# Patient Record
Sex: FEMALE | Race: WHITE | NOT HISPANIC OR LATINO | Employment: FULL TIME | URBAN - NONMETROPOLITAN AREA
[De-identification: names, ages, dates, MRNs, and addresses within clinical notes are randomized per-mention and may not be internally consistent; named-entity substitution may affect disease eponyms.]

---

## 2023-12-08 ENCOUNTER — TELEPHONE (OUTPATIENT)
Dept: GASTROENTEROLOGY | Facility: CLINIC | Age: 63
End: 2023-12-08

## 2023-12-08 ENCOUNTER — OFFICE VISIT (OUTPATIENT)
Dept: GASTROENTEROLOGY | Facility: CLINIC | Age: 63
End: 2023-12-08
Payer: COMMERCIAL

## 2023-12-08 VITALS — BODY MASS INDEX: 18.44 KG/M2 | WEIGHT: 108 LBS | HEIGHT: 64 IN

## 2023-12-08 DIAGNOSIS — R10.13 DYSPEPSIA: Primary | ICD-10-CM

## 2023-12-08 PROBLEM — B37.9 YEAST INFECTION: Status: ACTIVE | Noted: 2023-12-08

## 2023-12-08 PROCEDURE — 99214 OFFICE O/P EST MOD 30 MIN: CPT | Performed by: INTERNAL MEDICINE

## 2023-12-08 RX ORDER — FAMOTIDINE 40 MG/1
40 TABLET, FILM COATED ORAL DAILY
Qty: 84 TABLET | Refills: 4 | Status: SHIPPED
Start: 2023-12-08 | End: 2024-01-26 | Stop reason: WASHOUT

## 2023-12-08 RX ORDER — SUCRALFATE 1 G/1
1 TABLET ORAL
COMMUNITY
End: 2023-12-08

## 2023-12-08 RX ORDER — ONDANSETRON 4 MG/1
4 TABLET, ORALLY DISINTEGRATING ORAL EVERY 4 HOURS PRN
COMMUNITY
Start: 2023-12-01 | End: 2023-12-21 | Stop reason: ALTCHOICE

## 2023-12-08 RX ORDER — FLUCONAZOLE 150 MG/1
150 TABLET ORAL DAILY
Qty: 2 TABLET | Refills: 0 | Status: SHIPPED | OUTPATIENT
Start: 2023-12-08 | End: 2023-12-21 | Stop reason: WASHOUT

## 2023-12-08 ASSESSMENT — ENCOUNTER SYMPTOMS
HEMATOLOGIC/LYMPHATIC NEGATIVE: 1
CARDIOVASCULAR NEGATIVE: 1
EYES NEGATIVE: 1
NEUROLOGICAL NEGATIVE: 1
NAUSEA: 1
RESPIRATORY NEGATIVE: 1
ENDOCRINE NEGATIVE: 1
CONSTITUTIONAL NEGATIVE: 1
ABDOMINAL PAIN: 1

## 2023-12-08 NOTE — PROGRESS NOTES
Subjective   Patient ID: Ekaterina Truong is a 63 y.o. female who presents for Follow-up (Patient was seen at [facility] for kidney stone symptoms and had CT chest, abdomen and pelvis and had some abdominal findings on it. Patient was having lower back pain with leg cramping. Patient is on Zenpep for EPI. Patient is having some whiteish stools but is eating a lot of yogurt. Patient is concerned may have thrush and yeast infection. ) and Nausea (Pt reports no nausea until starting sucralfate. ).  HPI     Ekaterina was hospitalized for repeat hospital after finding right kidney stone.  She was able to pass the stone with out surgical intervention.  She did have findings of duodenitis on CT scan she was placed on Carafate at discharge along with antibiotics for 5 days for UTI.  Since returning home she has had persistent pain in her epigastrium she states she feels worse with the Carafate is making her nauseous and she does not feel like eating.  It did cause some minor constipation as well.  She denies any melanic stools or vomiting.    I was unable to review her CT as it was not available from [facility].  She states she has a follow-up CT scheduled for January 8.    She feels as though she developed a vaginal yeast infection with the antibiotics and requested prescription for Diflucan as well.    Review of Systems   Constitutional: Negative.    HENT: Negative.     Eyes: Negative.    Respiratory: Negative.     Cardiovascular: Negative.    Gastrointestinal:  Positive for abdominal pain and nausea.   Endocrine: Negative.    Genitourinary: Negative.    Neurological: Negative.    Hematological: Negative.        Objective   Physical Exam  Vitals and nursing note reviewed.   Constitutional:       Appearance: Normal appearance.   HENT:      Head: Normocephalic.      Mouth/Throat:      Mouth: Mucous membranes are moist.      Pharynx: Oropharynx is clear.   Eyes:      Conjunctiva/sclera: Conjunctivae normal.      Pupils:  Pupils are equal, round, and reactive to light.   Cardiovascular:      Pulses: Normal pulses.      Heart sounds: Normal heart sounds.   Pulmonary:      Effort: Pulmonary effort is normal.      Breath sounds: Normal breath sounds.   Abdominal:      General: Abdomen is flat. Bowel sounds are normal.      Palpations: Abdomen is soft.   Musculoskeletal:      Cervical back: Normal range of motion and neck supple.   Skin:     General: Skin is warm and dry.   Neurological:      General: No focal deficit present.      Mental Status: She is alert and oriented to person, place, and time.   Psychiatric:         Behavior: Behavior normal.         Assessment/Plan   Problem List Items Addressed This Visit             ICD-10-CM    Dyspepsia - Primary R10.13    Relevant Medications    famotidine (Pepcid) 40 mg tablet    fluconazole (Diflucan) 150 mg tablet     I have recommended she discontinue the Carafate will begin Pepcid 40 mg daily.  Will prescribe Diflucan 150 mg to be taken today the second pill is prescribed be taken 3 days later for the yeast infection is not resolved.  I advised her to call in 2 weeks of her symptoms of dyspepsia if not improved would like to arrange endoscopy.  I advised her that it might be best to get the CT scan before the end of the year for insurance reasons and she will contact urologist regarding same.       Brian Fairbanks DO 12/08/23 1:50 PM

## 2023-12-21 ENCOUNTER — OFFICE VISIT (OUTPATIENT)
Dept: PRIMARY CARE | Facility: CLINIC | Age: 63
End: 2023-12-21
Payer: COMMERCIAL

## 2023-12-21 ENCOUNTER — TELEPHONE (OUTPATIENT)
Dept: GASTROENTEROLOGY | Facility: CLINIC | Age: 63
End: 2023-12-21
Payer: COMMERCIAL

## 2023-12-21 VITALS
BODY MASS INDEX: 17.42 KG/M2 | TEMPERATURE: 98.6 F | DIASTOLIC BLOOD PRESSURE: 98 MMHG | HEIGHT: 64 IN | SYSTOLIC BLOOD PRESSURE: 156 MMHG | HEART RATE: 101 BPM | WEIGHT: 102 LBS

## 2023-12-21 DIAGNOSIS — J06.9 UPPER RESPIRATORY TRACT INFECTION, UNSPECIFIED TYPE: Primary | ICD-10-CM

## 2023-12-21 DIAGNOSIS — R03.0 ELEVATED BP WITHOUT DIAGNOSIS OF HYPERTENSION: ICD-10-CM

## 2023-12-21 LAB — POC RAPID STREP: NEGATIVE

## 2023-12-21 PROCEDURE — 99213 OFFICE O/P EST LOW 20 MIN: CPT

## 2023-12-21 PROCEDURE — 87880 STREP A ASSAY W/OPTIC: CPT

## 2023-12-21 RX ORDER — AZITHROMYCIN 250 MG/1
TABLET, FILM COATED ORAL
Qty: 6 TABLET | Refills: 0 | Status: SHIPPED | OUTPATIENT
Start: 2023-12-21 | End: 2023-12-25

## 2023-12-21 ASSESSMENT — PATIENT HEALTH QUESTIONNAIRE - PHQ9
2. FEELING DOWN, DEPRESSED OR HOPELESS: NOT AT ALL
SUM OF ALL RESPONSES TO PHQ9 QUESTIONS 1 AND 2: 0
1. LITTLE INTEREST OR PLEASURE IN DOING THINGS: NOT AT ALL

## 2023-12-21 ASSESSMENT — ENCOUNTER SYMPTOMS
FATIGUE: 1
CARDIOVASCULAR NEGATIVE: 1
RESPIRATORY NEGATIVE: 1
GASTROINTESTINAL NEGATIVE: 1

## 2023-12-21 NOTE — TELEPHONE ENCOUNTER
Patient called stating that she has been taking Pepcid and is not having an appetite and some nausea, she asked if she could switch back to her Pantoprazole instead, so I instructed her to take the pantoprazole this evening instead of the Pepcid and to start back on the pepcid if it was more effective for her. I have sent Dr. Fairbanks a message with this information and awaiting a response. Patient also states that she feels like she has a sinus infection and requested an RX for an antibiotic. I instructed her to call her PCP due to Dr. Fairbanks being out of the office. Patient verbalized understanding.

## 2023-12-22 ENCOUNTER — TELEPHONE (OUTPATIENT)
Dept: GASTROENTEROLOGY | Facility: CLINIC | Age: 63
End: 2023-12-22
Payer: COMMERCIAL

## 2023-12-22 NOTE — TELEPHONE ENCOUNTER
PATIENT NOTIFIED AND VERBALIZED UNDERSTANDING     ----- Message from Brian Fairbanks DO sent at 12/21/2023  2:41 PM EST -----  Have her try taking Pepcid twice daily.  ----- Message -----  From: Claudia Ramirez MA  Sent: 12/21/2023  10:08 AM EST  To: Brian Fairbanks DO    Pt calling stating that she believes the medication is helping her at night but not during the day for PPI but wearing off during the day. She is taking it in the evening

## 2023-12-28 ENCOUNTER — TELEPHONE (OUTPATIENT)
Dept: PRIMARY CARE | Facility: CLINIC | Age: 63
End: 2023-12-28
Payer: COMMERCIAL

## 2023-12-28 NOTE — TELEPHONE ENCOUNTER
Patient called and was reporting her BP   12/22//84  12/23//84  12/24//86  12/25//85  12/26//83  12/27//88  12/28//84    She said started a stress J vitamin which seems to help, she is doing what she can to get her BP down without going on medication.   If you have questions please call her   Thank you

## 2024-01-26 ENCOUNTER — OFFICE VISIT (OUTPATIENT)
Dept: PRIMARY CARE | Facility: CLINIC | Age: 64
End: 2024-01-26
Payer: COMMERCIAL

## 2024-01-26 VITALS
OXYGEN SATURATION: 98 % | BODY MASS INDEX: 17.42 KG/M2 | TEMPERATURE: 97.3 F | HEIGHT: 64 IN | HEART RATE: 97 BPM | SYSTOLIC BLOOD PRESSURE: 146 MMHG | WEIGHT: 102 LBS | DIASTOLIC BLOOD PRESSURE: 90 MMHG

## 2024-01-26 DIAGNOSIS — R03.0 ELEVATED BP WITHOUT DIAGNOSIS OF HYPERTENSION: ICD-10-CM

## 2024-01-26 DIAGNOSIS — Z13.220 SCREENING FOR CHOLESTEROL LEVEL: ICD-10-CM

## 2024-01-26 DIAGNOSIS — R39.9 UTI SYMPTOMS: ICD-10-CM

## 2024-01-26 DIAGNOSIS — Z13.29 SCREENING FOR THYROID DISORDER: ICD-10-CM

## 2024-01-26 DIAGNOSIS — Z12.31 SCREENING MAMMOGRAM FOR BREAST CANCER: ICD-10-CM

## 2024-01-26 DIAGNOSIS — J01.40 ACUTE NON-RECURRENT PANSINUSITIS: Primary | ICD-10-CM

## 2024-01-26 DIAGNOSIS — R10.13 DYSPEPSIA: ICD-10-CM

## 2024-01-26 LAB
POC APPEARANCE, URINE: CLEAR
POC BILIRUBIN, URINE: NEGATIVE
POC BLOOD, URINE: ABNORMAL
POC COLOR, URINE: YELLOW
POC GLUCOSE, URINE: NEGATIVE MG/DL
POC KETONES, URINE: NEGATIVE MG/DL
POC LEUKOCYTES, URINE: NEGATIVE
POC NITRITE,URINE: NEGATIVE
POC PH, URINE: 7 PH
POC PROTEIN, URINE: NEGATIVE MG/DL
POC SPECIFIC GRAVITY, URINE: 1.01
POC UROBILINOGEN, URINE: 0.2 EU/DL

## 2024-01-26 PROCEDURE — 99214 OFFICE O/P EST MOD 30 MIN: CPT

## 2024-01-26 PROCEDURE — 81003 URINALYSIS AUTO W/O SCOPE: CPT

## 2024-01-26 RX ORDER — PANTOPRAZOLE SODIUM 40 MG/1
40 TABLET, DELAYED RELEASE ORAL
COMMUNITY
Start: 2024-01-03

## 2024-01-26 RX ORDER — AMOXICILLIN AND CLAVULANATE POTASSIUM 875; 125 MG/1; MG/1
875 TABLET, FILM COATED ORAL 2 TIMES DAILY
Qty: 10 TABLET | Refills: 0 | Status: SHIPPED | OUTPATIENT
Start: 2024-01-26 | End: 2024-02-01 | Stop reason: ALTCHOICE

## 2024-01-26 ASSESSMENT — ENCOUNTER SYMPTOMS
CONSTITUTIONAL NEGATIVE: 1
CARDIOVASCULAR NEGATIVE: 1
GASTROINTESTINAL NEGATIVE: 1
RESPIRATORY NEGATIVE: 1

## 2024-01-26 ASSESSMENT — PATIENT HEALTH QUESTIONNAIRE - PHQ9
1. LITTLE INTEREST OR PLEASURE IN DOING THINGS: NOT AT ALL
2. FEELING DOWN, DEPRESSED OR HOPELESS: NOT AT ALL
SUM OF ALL RESPONSES TO PHQ9 QUESTIONS 1 AND 2: 0

## 2024-01-26 NOTE — PROGRESS NOTES
"Subjective   Patient ID: Ekaterina Truong is a 63 y.o. female who presents for pt here for 1 month follow up BP  (Pt c/o cough, sinus drainage , HA x 4 days ).    HPI   ELEVATED BP: BP a little elevated in office today, she does have whitecoat HTN, BP at home consistently <130/80, denies symptoms currently.  DYSPEPSIA: Follows with Dr. Fairbanks.    Endorses sinus pressure/pain, congestion, post nasal drip, cough x4 days.    Endorses has started taking more vitamin supplements recently and urine is somewhat cloudy, no dysuria or other LUTS symptoms.    Needs mammogram.  Colonoscopy per Dr. Fairbanks, up to date.    Review of Systems   Constitutional: Negative.    Respiratory: Negative.     Cardiovascular: Negative.    Gastrointestinal: Negative.        Objective   /90   Pulse 97   Temp 36.3 °C (97.3 °F)   Ht 1.62 m (5' 3.78\")   Wt 46.3 kg (102 lb)   SpO2 98%   BMI 17.63 kg/m²     Physical Exam  Constitutional:       General: She is not in acute distress.     Appearance: Normal appearance. She is not ill-appearing or toxic-appearing.   HENT:      Head: Normocephalic and atraumatic.   Eyes:      Extraocular Movements: Extraocular movements intact.      Conjunctiva/sclera: Conjunctivae normal.   Cardiovascular:      Rate and Rhythm: Normal rate and regular rhythm.      Heart sounds: Normal heart sounds. No murmur heard.     No gallop.   Pulmonary:      Effort: Pulmonary effort is normal.      Breath sounds: Normal breath sounds. No stridor. No wheezing.   Abdominal:      General: Bowel sounds are normal. There is no distension.      Palpations: Abdomen is soft.      Tenderness: There is no abdominal tenderness. There is no right CVA tenderness, left CVA tenderness, guarding or rebound.   Musculoskeletal:         General: Normal range of motion.      Cervical back: Normal range of motion and neck supple.   Skin:     General: Skin is warm and dry.      Findings: No erythema or rash.   Neurological:      General: No " focal deficit present.      Mental Status: She is alert and oriented to person, place, and time.   Psychiatric:         Mood and Affect: Mood normal.         Behavior: Behavior normal.         Thought Content: Thought content normal.         Judgment: Judgment normal.         Assessment/Plan        Rx augmentin BID x5 days.  Fasting labs soon.  Mammogram ordered.  Advised she schedule with GYN.  IO UA unremarkable.  Follow up 3 months or sooner prn.

## 2024-01-29 ENCOUNTER — TELEPHONE (OUTPATIENT)
Dept: PRIMARY CARE | Facility: CLINIC | Age: 64
End: 2024-01-29
Payer: COMMERCIAL

## 2024-01-29 NOTE — TELEPHONE ENCOUNTER
----- Message from Salvador Sadler PA-C sent at 1/29/2024  8:45 AM EST -----  Please let her know her labs look great other than elevated cholesterol, her 10 year cardiac risk is intermediate, we can discuss cholesterol medication at follow up. Thanks!

## 2024-01-31 ENCOUNTER — TELEPHONE (OUTPATIENT)
Dept: PRIMARY CARE | Facility: CLINIC | Age: 64
End: 2024-01-31
Payer: COMMERCIAL

## 2024-02-01 DIAGNOSIS — J01.40 ACUTE NON-RECURRENT PANSINUSITIS: Primary | ICD-10-CM

## 2024-02-01 DIAGNOSIS — B37.9 YEAST INFECTION: Primary | ICD-10-CM

## 2024-02-01 RX ORDER — AZITHROMYCIN 250 MG/1
TABLET, FILM COATED ORAL
Qty: 6 TABLET | Refills: 0 | Status: SHIPPED | OUTPATIENT
Start: 2024-02-01 | End: 2024-02-07 | Stop reason: ALTCHOICE

## 2024-02-01 RX ORDER — FLUCONAZOLE 150 MG/1
150 TABLET ORAL ONCE
Qty: 1 TABLET | Refills: 0 | Status: SHIPPED | OUTPATIENT
Start: 2024-02-01 | End: 2024-02-07 | Stop reason: WASHOUT

## 2024-02-07 DIAGNOSIS — R10.13 DYSPEPSIA: ICD-10-CM

## 2024-02-07 DIAGNOSIS — R10.13 EPIGASTRIC PAIN: ICD-10-CM

## 2024-02-07 RX ORDER — SUCRALFATE 1 G/1
1 TABLET ORAL
COMMUNITY
Start: 2024-02-05 | End: 2024-02-15 | Stop reason: SDUPTHER

## 2024-02-09 ENCOUNTER — HOSPITAL ENCOUNTER (OUTPATIENT)
Dept: GASTROENTEROLOGY | Facility: CLINIC | Age: 64
Setting detail: OUTPATIENT SURGERY
Discharge: HOME | End: 2024-02-09
Payer: COMMERCIAL

## 2024-02-09 VITALS
WEIGHT: 99.4 LBS | TEMPERATURE: 99.2 F | DIASTOLIC BLOOD PRESSURE: 77 MMHG | HEART RATE: 87 BPM | RESPIRATION RATE: 18 BRPM | SYSTOLIC BLOOD PRESSURE: 148 MMHG | BODY MASS INDEX: 16.56 KG/M2 | OXYGEN SATURATION: 96 % | HEIGHT: 65 IN

## 2024-02-09 DIAGNOSIS — R10.13 DYSPEPSIA: Primary | ICD-10-CM

## 2024-02-09 DIAGNOSIS — R10.13 EPIGASTRIC PAIN: ICD-10-CM

## 2024-02-09 PROCEDURE — 88305 TISSUE EXAM BY PATHOLOGIST: CPT | Performed by: PATHOLOGY

## 2024-02-09 PROCEDURE — 2500000005 HC RX 250 GENERAL PHARMACY W/O HCPCS: Performed by: INTERNAL MEDICINE

## 2024-02-09 PROCEDURE — 2500000004 HC RX 250 GENERAL PHARMACY W/ HCPCS (ALT 636 FOR OP/ED): Performed by: INTERNAL MEDICINE

## 2024-02-09 PROCEDURE — 43239 EGD BIOPSY SINGLE/MULTIPLE: CPT | Performed by: INTERNAL MEDICINE

## 2024-02-09 PROCEDURE — 0753T DGTZ GLS MCRSCP SLD LEVEL IV: CPT | Mod: TC,SUR,SAMLAB | Performed by: INTERNAL MEDICINE

## 2024-02-09 PROCEDURE — 7100000009 HC PHASE TWO TIME - INITIAL BASE CHARGE

## 2024-02-09 PROCEDURE — 99152 MOD SED SAME PHYS/QHP 5/>YRS: CPT | Mod: 59 | Performed by: INTERNAL MEDICINE

## 2024-02-09 PROCEDURE — 7100000010 HC PHASE TWO TIME - EACH INCREMENTAL 1 MINUTE

## 2024-02-09 RX ORDER — MIDAZOLAM HYDROCHLORIDE 5 MG/ML
INJECTION, SOLUTION INTRAMUSCULAR; INTRAVENOUS AS NEEDED
Status: COMPLETED | OUTPATIENT
Start: 2024-02-09 | End: 2024-02-09

## 2024-02-09 RX ORDER — FENTANYL CITRATE 50 UG/ML
INJECTION, SOLUTION INTRAMUSCULAR; INTRAVENOUS AS NEEDED
Status: COMPLETED | OUTPATIENT
Start: 2024-02-09 | End: 2024-02-09

## 2024-02-09 RX ORDER — SODIUM CHLORIDE, SODIUM LACTATE, POTASSIUM CHLORIDE, CALCIUM CHLORIDE 600; 310; 30; 20 MG/100ML; MG/100ML; MG/100ML; MG/100ML
20 INJECTION, SOLUTION INTRAVENOUS CONTINUOUS
Status: DISCONTINUED | OUTPATIENT
Start: 2024-02-09 | End: 2024-02-10 | Stop reason: HOSPADM

## 2024-02-09 RX ADMIN — MIDAZOLAM HYDROCHLORIDE 2.5 MG: 5 INJECTION, SOLUTION INTRAMUSCULAR; INTRAVENOUS at 15:30

## 2024-02-09 RX ADMIN — FENTANYL CITRATE 25 MCG: 50 INJECTION, SOLUTION INTRAMUSCULAR; INTRAVENOUS at 15:33

## 2024-02-09 RX ADMIN — BENZOCAINE, BUTAMBEN, AND TETRACAINE HYDROCHLORIDE 2 SPRAY: .028; .004; .004 AEROSOL, SPRAY TOPICAL at 15:27

## 2024-02-09 RX ADMIN — FENTANYL CITRATE 50 MCG: 50 INJECTION, SOLUTION INTRAMUSCULAR; INTRAVENOUS at 15:30

## 2024-02-09 RX ADMIN — SODIUM CHLORIDE, POTASSIUM CHLORIDE, SODIUM LACTATE AND CALCIUM CHLORIDE 20 ML/HR: 600; 310; 30; 20 INJECTION, SOLUTION INTRAVENOUS at 13:38

## 2024-02-09 ASSESSMENT — ENCOUNTER SYMPTOMS
NAUSEA: 1
HEMATOLOGIC/LYMPHATIC NEGATIVE: 1
ABDOMINAL PAIN: 1
CONSTITUTIONAL NEGATIVE: 1
EYES NEGATIVE: 1
RESPIRATORY NEGATIVE: 1
CARDIOVASCULAR NEGATIVE: 1
ENDOCRINE NEGATIVE: 1
NEUROLOGICAL NEGATIVE: 1

## 2024-02-09 ASSESSMENT — PAIN - FUNCTIONAL ASSESSMENT
PAIN_FUNCTIONAL_ASSESSMENT: 0-10

## 2024-02-09 ASSESSMENT — PAIN SCALES - GENERAL
PAINLEVEL_OUTOF10: 0 - NO PAIN

## 2024-02-09 NOTE — H&P
History Of Present Illness  Ekaterina Truong is a 63 y.o. female presenting with chronic dyspepsia abdominal pain presents for EGD.     Past Medical History  Past Medical History:   Diagnosis Date    Abdominal distension (gaseous)     Bloating    Anesthesia of skin     Numbness and tingling    Attention and concentration deficit     Difficulty concentrating    Calculus of kidney     Bilateral kidney stones    Inadequate sleep hygiene     History of difficulty sleeping    Lobulated, fused and horseshoe kidney     Horseshoe kidney    Other conditions influencing health status     Cyst of neck    Other general symptoms and signs     Heat intolerance    Personal history of other diseases of the digestive system     History of constipation    Personal history of other diseases of the musculoskeletal system and connective tissue     History of chronic back pain    Personal history of other diseases of the musculoskeletal system and connective tissue     History of neck pain    Personal history of other diseases of the respiratory system     History of acute sinusitis    Personal history of other diseases of the respiratory system     History of asthma    Personal history of other specified conditions     History of abdominal pain    Personal history of other specified conditions     History of dizziness    Personal history of other specified conditions     History of fever    Personal history of other specified conditions     History of urinary frequency     Surgical History  Past Surgical History:   Procedure Laterality Date    OTHER SURGICAL HISTORY  10/11/2022    Gallbladder surgery    OTHER SURGICAL HISTORY  10/11/2022     section    OTHER SURGICAL HISTORY  10/24/2022    Colonoscopy     Social History  She reports that she has been smoking cigarettes. She has never used smokeless tobacco. She reports that she does not drink alcohol and does not use drugs.    Family History  Family History   Problem Relation  "Name Age of Onset    Hypertension Father      Ovarian cancer Sister          Allergies  Allergies   Allergen Reactions    Tamsulosin Shortness of breath    Cephalosporins Itching    Doxycycline Other    Naproxen Sodium Itching    Omeprazole Itching    Sulfamethoxazole-Trimethoprim Itching     Review of Systems   Constitutional: Negative.    HENT: Negative.     Eyes: Negative.    Respiratory: Negative.     Cardiovascular: Negative.    Gastrointestinal:  Positive for abdominal pain and nausea.   Endocrine: Negative.    Genitourinary: Negative.    Neurological: Negative.    Hematological: Negative.      Pre-sedation Evaluation:  ASA Classification - ASA 2 - Patient with mild systemic disease with no functional limitations  Mallampati Score - II (hard and soft palate, upper portion of tonsils anduvula visible)    Physical Exam  Vitals and nursing note reviewed.   Constitutional:       Appearance: Normal appearance.   HENT:      Head: Normocephalic.      Mouth/Throat:      Mouth: Mucous membranes are moist.      Pharynx: Oropharynx is clear.   Eyes:      Conjunctiva/sclera: Conjunctivae normal.      Pupils: Pupils are equal, round, and reactive to light.   Cardiovascular:      Pulses: Normal pulses.      Heart sounds: Normal heart sounds.   Pulmonary:      Effort: Pulmonary effort is normal.      Breath sounds: Normal breath sounds.   Abdominal:      General: Abdomen is flat. Bowel sounds are normal.      Palpations: Abdomen is soft.   Musculoskeletal:      Cervical back: Normal range of motion and neck supple.   Skin:     General: Skin is warm and dry.   Neurological:      General: No focal deficit present.      Mental Status: She is alert and oriented to person, place, and time.   Psychiatric:         Behavior: Behavior normal.          Last Recorded Vitals  Blood pressure 152/77, pulse 92, temperature 37.3 °C (99.2 °F), temperature source Temporal, resp. rate 17, height 1.638 m (5' 4.5\"), weight 45.1 kg (99 lb 6.4 " oz), SpO2 95 %.     Assessment/Plan   Problem List Items Addressed This Visit       Dyspepsia - Primary    Relevant Orders    EGD     Other Visit Diagnoses       Epigastric pain        Relevant Orders    EGD               PTA/Current Medications:  (Not in a hospital admission)    Current Outpatient Medications   Medication Sig Dispense Refill    lipase-protease-amylase (Zenpep) 40,000-126,000- 168,000 unit capsule Take by mouth 3 times a day.      pantoprazole (ProtoNix) 40 mg EC tablet Take 1 tablet (40 mg) by mouth once daily in the morning. Take before meals.      sucralfate (Carafate) 1 gram tablet Take 1 tablet (1 g) by mouth.      vitamin-B complex split tablet Take by mouth.       No current facility-administered medications for this encounter.     Brian Fairbanks, DO

## 2024-02-09 NOTE — DISCHARGE INSTRUCTIONS

## 2024-02-14 LAB
LABORATORY COMMENT REPORT: NORMAL
PATH REPORT.FINAL DX SPEC: NORMAL
PATH REPORT.GROSS SPEC: NORMAL
PATH REPORT.TOTAL CANCER: NORMAL

## 2024-02-15 ENCOUNTER — OFFICE VISIT (OUTPATIENT)
Dept: GASTROENTEROLOGY | Facility: CLINIC | Age: 64
End: 2024-02-15
Payer: COMMERCIAL

## 2024-02-15 VITALS — HEIGHT: 65 IN | BODY MASS INDEX: 16.83 KG/M2 | WEIGHT: 101 LBS

## 2024-02-15 DIAGNOSIS — B37.9 YEAST INFECTION: Primary | ICD-10-CM

## 2024-02-15 DIAGNOSIS — K29.60 ALKALINE REFLUX GASTRITIS: Primary | ICD-10-CM

## 2024-02-15 PROCEDURE — 99213 OFFICE O/P EST LOW 20 MIN: CPT | Performed by: INTERNAL MEDICINE

## 2024-02-15 RX ORDER — SUCRALFATE 1 G/1
1 TABLET ORAL
Qty: 60 TABLET | Refills: 5 | Status: SHIPPED | OUTPATIENT
Start: 2024-02-15 | End: 2025-02-14

## 2024-02-15 RX ORDER — FLUCONAZOLE 150 MG/1
150 TABLET ORAL DAILY
Qty: 3 TABLET | Refills: 0 | Status: SHIPPED | OUTPATIENT
Start: 2024-02-15 | End: 2024-02-19

## 2024-02-15 ASSESSMENT — ENCOUNTER SYMPTOMS
CONSTITUTIONAL NEGATIVE: 1
HEMATOLOGIC/LYMPHATIC NEGATIVE: 1
EYES NEGATIVE: 1
ABDOMINAL PAIN: 1
NEUROLOGICAL NEGATIVE: 1
ENDOCRINE NEGATIVE: 1
NAUSEA: 1
RESPIRATORY NEGATIVE: 1
CARDIOVASCULAR NEGATIVE: 1

## 2024-02-15 NOTE — PROGRESS NOTES
Subjective   Patient ID: Ekaterina Truong is a 63 y.o. female who presents for Follow-up (Epigastric pain, bloating, taking Sucralfate. ).    Amina is seen today in follow-up.  Underwent endoscopy February night.  Upper endoscopy revealed bile gastritis.  Biopsies showed no H. 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    She is felt better with Carafate.  Still has occasional stomach burning.    Does feel extremely stressed and anxious is having some panic attacks.  Several years ago was advised by Dr. Gipson and anxiety medicines she declined at that time but now is wondering if she should be on something to control her ability to cope with stress daily.  I advised her to follow-up with her family doctor and continue that conversation with him.    Review of Systems   Constitutional: Negative.    HENT: Negative.     Eyes: Negative.    Respiratory: Negative.     Cardiovascular: Negative.    Gastrointestinal:  Positive for abdominal pain and nausea.   Endocrine: Negative.    Genitourinary: Negative.    Neurological: Negative.    Hematological: Negative.        Objective   Physical Exam  Vitals and nursing note reviewed.   Constitutional:       Appearance: Normal appearance.   HENT:      Head: Normocephalic.      Mouth/Throat:      Mouth: Mucous membranes are moist.      Pharynx: Oropharynx is clear.   Eyes:      Conjunctiva/sclera: Conjunctivae normal.      Pupils: Pupils are equal, round, and reactive to light.   Cardiovascular:      Pulses: Normal pulses.      Heart sounds: Normal heart sounds.   Pulmonary:      Effort: Pulmonary effort is normal.      Breath sounds: Normal breath sounds.   Abdominal:      General: Abdomen is flat. Bowel sounds are normal.      Palpations: Abdomen is soft.   Musculoskeletal:      Cervical back: Normal range of motion and neck supple.   Skin:     General: Skin is warm and dry.   Neurological:      General: No focal deficit present.       Mental Status: She is alert and oriented to person, place, and time.   Psychiatric:         Behavior: Behavior normal.       Assessment/Plan   Diagnoses and all orders for this visit:  Alkaline reflux gastritis  -     sucralfate (Carafate) 1 gram tablet; Take 1 tablet (1 g) by mouth 2 times a day before meals.  Symptoms of alkaline gastritis improved with Carafate.  Recommend continue Carafate 1 g twice daily continue Protonix daily.  EPI is well-controlled with current dose of Zenpep.  Advised her to follow-up with family doctor regarding blood pressure and anxiety.  Will see her back in 3 months         Brian Fairbanks DO 02/15/24 8:34 AM

## 2024-02-16 ENCOUNTER — TELEPHONE (OUTPATIENT)
Dept: PRIMARY CARE | Facility: CLINIC | Age: 64
End: 2024-02-16
Payer: COMMERCIAL

## 2024-02-16 DIAGNOSIS — J01.40 ACUTE NON-RECURRENT PANSINUSITIS: Primary | ICD-10-CM

## 2024-02-16 RX ORDER — AZITHROMYCIN 250 MG/1
TABLET, FILM COATED ORAL
Qty: 6 TABLET | Refills: 0 | Status: SHIPPED | OUTPATIENT
Start: 2024-02-16 | End: 2024-02-21

## 2024-02-16 NOTE — TELEPHONE ENCOUNTER
SHE CALLED IN AND WANTS TO KNOW IF YOU CAN CALL IN ANOTHER Z PACK, SHE DON'T THINK THE FIRST ONE WORKED, STILL COUGHING AND SORE THROAT. HER PHONE NUMBER [phone]. THANK YOU.

## 2024-02-17 ENCOUNTER — TELEPHONE (OUTPATIENT)
Dept: PRIMARY CARE | Facility: CLINIC | Age: 64
End: 2024-02-17

## 2024-02-19 ENCOUNTER — OFFICE VISIT (OUTPATIENT)
Dept: PRIMARY CARE | Facility: CLINIC | Age: 64
End: 2024-02-19

## 2024-02-19 VITALS
DIASTOLIC BLOOD PRESSURE: 70 MMHG | SYSTOLIC BLOOD PRESSURE: 142 MMHG | HEART RATE: 98 BPM | BODY MASS INDEX: 16.66 KG/M2 | WEIGHT: 100 LBS | HEIGHT: 65 IN | OXYGEN SATURATION: 98 %

## 2024-02-19 DIAGNOSIS — F32.A DEPRESSION, UNSPECIFIED DEPRESSION TYPE: ICD-10-CM

## 2024-02-19 DIAGNOSIS — F41.9 ANXIETY: Primary | ICD-10-CM

## 2024-02-19 PROCEDURE — 99214 OFFICE O/P EST MOD 30 MIN: CPT

## 2024-02-19 RX ORDER — PANCRELIPASE LIPASE, PANCRELIPASE PROTEASE, PANCRELIPASE AMYLASE 40000; 126000; 168000 [USP'U]/1; [USP'U]/1; [USP'U]/1
2 CAPSULE, DELAYED RELEASE ORAL 3 TIMES DAILY
Qty: 200 CAPSULE | Refills: 0 | OUTPATIENT
Start: 2024-02-19

## 2024-02-19 RX ORDER — BUSPIRONE HYDROCHLORIDE 5 MG/1
5 TABLET ORAL 2 TIMES DAILY PRN
Qty: 60 TABLET | Refills: 0 | Status: SHIPPED | OUTPATIENT
Start: 2024-02-19 | End: 2024-03-19 | Stop reason: SDUPTHER

## 2024-02-19 RX ORDER — BUPROPION HYDROCHLORIDE 150 MG/1
150 TABLET ORAL EVERY MORNING
Qty: 30 TABLET | Refills: 1 | Status: SHIPPED | OUTPATIENT
Start: 2024-02-19 | End: 2024-03-19 | Stop reason: SINTOL

## 2024-02-19 ASSESSMENT — ENCOUNTER SYMPTOMS
GASTROINTESTINAL NEGATIVE: 1
RESPIRATORY NEGATIVE: 1
CONSTITUTIONAL NEGATIVE: 1
CARDIOVASCULAR NEGATIVE: 1

## 2024-02-19 ASSESSMENT — PATIENT HEALTH QUESTIONNAIRE - PHQ9
1. LITTLE INTEREST OR PLEASURE IN DOING THINGS: NOT AT ALL
SUM OF ALL RESPONSES TO PHQ9 QUESTIONS 1 AND 2: 0
2. FEELING DOWN, DEPRESSED OR HOPELESS: NOT AT ALL

## 2024-02-19 NOTE — PROGRESS NOTES
"Subjective   Patient ID: Ekaterina Truong is a 63 y.o. female who presents for pt here to discuss anxiety and stress .    HPI   ANXIETY/DEPRESSION: RAJEEV 18, PHQ 16 today. Endorses has been struggling with anxiety/stress for some time now. She does have some chronic health conditions, mainly GI, which cause her quite a bit of stress. Has never been medicated before.    Review of Systems   Constitutional: Negative.    Respiratory: Negative.     Cardiovascular: Negative.    Gastrointestinal: Negative.        Objective   /70   Pulse 98   Ht 1.646 m (5' 4.8\")   Wt 45.4 kg (100 lb)   SpO2 98%   BMI 16.74 kg/m²     Physical Exam  Constitutional:       General: She is not in acute distress.     Appearance: Normal appearance. She is not ill-appearing.   HENT:      Head: Normocephalic and atraumatic.   Eyes:      Extraocular Movements: Extraocular movements intact.      Conjunctiva/sclera: Conjunctivae normal.   Cardiovascular:      Rate and Rhythm: Normal rate.   Pulmonary:      Effort: Pulmonary effort is normal.   Abdominal:      General: There is no distension.   Musculoskeletal:         General: Normal range of motion.      Cervical back: Normal range of motion.   Skin:     General: Skin is warm and dry.   Neurological:      General: No focal deficit present.      Mental Status: She is alert and oriented to person, place, and time.   Psychiatric:         Behavior: Behavior normal.         Thought Content: Thought content normal.         Judgment: Judgment normal.      Comments: Crying during visit         Assessment/Plan        Rx Wellbutrin 150mg daily, Rx Buspar 5mg BID prn. Advised to call if SE. Most of visit spent counseling patient on coping mechanisms and importance of counseling.  Follow up 1 month or sooner prn.  "

## 2024-03-19 ENCOUNTER — OFFICE VISIT (OUTPATIENT)
Dept: PRIMARY CARE | Facility: CLINIC | Age: 64
End: 2024-03-19
Payer: COMMERCIAL

## 2024-03-19 VITALS
HEIGHT: 64 IN | WEIGHT: 101 LBS | OXYGEN SATURATION: 96 % | HEART RATE: 86 BPM | DIASTOLIC BLOOD PRESSURE: 80 MMHG | BODY MASS INDEX: 17.24 KG/M2 | SYSTOLIC BLOOD PRESSURE: 138 MMHG

## 2024-03-19 DIAGNOSIS — F32.A DEPRESSION, UNSPECIFIED DEPRESSION TYPE: ICD-10-CM

## 2024-03-19 DIAGNOSIS — F41.9 ANXIETY: ICD-10-CM

## 2024-03-19 PROCEDURE — 99213 OFFICE O/P EST LOW 20 MIN: CPT

## 2024-03-19 RX ORDER — BUSPIRONE HYDROCHLORIDE 10 MG/1
10 TABLET ORAL 2 TIMES DAILY PRN
Qty: 60 TABLET | Refills: 1 | Status: SHIPPED | OUTPATIENT
Start: 2024-03-19 | End: 2024-05-18

## 2024-03-19 RX ORDER — METRONIDAZOLE 500 MG/1
500 TABLET ORAL 2 TIMES DAILY
COMMUNITY
Start: 2024-03-15 | End: 2024-03-22

## 2024-03-19 NOTE — PROGRESS NOTES
"Subjective   Patient ID: Ekaterina Truong is a 63 y.o. female who presents for pt here for 1 month med check .    HPI   ANXIETY/DEPRESSION: RAJEEV 17, PHQ 16 today. Endorses has been struggling with anxiety/stress for some time now. She does have some chronic health conditions, mainly GI, which cause her quite a bit of stress. Started Wellbutrin and Buspar 1 month ago. SE of nosebleeds to Wellbutrin. Buspar has been helping somewhat.    Review of Systems   Constitutional: Negative.    Respiratory: Negative.     Cardiovascular: Negative.    Gastrointestinal: Negative.        Objective   /80   Pulse 86   Ht 1.638 m (5' 4.48\")   Wt 45.8 kg (101 lb)   SpO2 96%   BMI 17.08 kg/m²     Physical Exam  Constitutional:       General: She is not in acute distress.     Appearance: Normal appearance. She is not ill-appearing.   HENT:      Head: Normocephalic and atraumatic.   Eyes:      Extraocular Movements: Extraocular movements intact.      Conjunctiva/sclera: Conjunctivae normal.   Cardiovascular:      Rate and Rhythm: Normal rate.   Pulmonary:      Effort: Pulmonary effort is normal.   Abdominal:      General: There is no distension.   Musculoskeletal:         General: Normal range of motion.      Cervical back: Normal range of motion.   Skin:     General: Skin is warm and dry.   Neurological:      General: No focal deficit present.      Mental Status: She is alert and oriented to person, place, and time.   Psychiatric:         Mood and Affect: Mood normal.         Behavior: Behavior normal.         Thought Content: Thought content normal.         Judgment: Judgment normal.         Assessment/Plan        Increase Buspar to 20mg daily.  Follow up 1 month or sooner prn.  "

## 2024-04-26 ENCOUNTER — OFFICE VISIT (OUTPATIENT)
Dept: PRIMARY CARE | Facility: CLINIC | Age: 64
End: 2024-04-26
Payer: COMMERCIAL

## 2024-04-26 VITALS
HEART RATE: 90 BPM | DIASTOLIC BLOOD PRESSURE: 80 MMHG | SYSTOLIC BLOOD PRESSURE: 148 MMHG | BODY MASS INDEX: 17.33 KG/M2 | HEIGHT: 65 IN | OXYGEN SATURATION: 98 % | WEIGHT: 104 LBS

## 2024-04-26 DIAGNOSIS — B37.9 YEAST INFECTION: ICD-10-CM

## 2024-04-26 DIAGNOSIS — F41.9 ANXIETY: Primary | ICD-10-CM

## 2024-04-26 DIAGNOSIS — J06.9 UPPER RESPIRATORY TRACT INFECTION, UNSPECIFIED TYPE: ICD-10-CM

## 2024-04-26 PROCEDURE — 99213 OFFICE O/P EST LOW 20 MIN: CPT

## 2024-04-26 RX ORDER — BUPROPION HYDROCHLORIDE 150 MG/1
150 TABLET ORAL EVERY MORNING
Qty: 30 TABLET | Refills: 1 | Status: SHIPPED | OUTPATIENT
Start: 2024-04-26 | End: 2024-06-25

## 2024-04-26 RX ORDER — AZITHROMYCIN 250 MG/1
TABLET, FILM COATED ORAL DAILY
Qty: 6 TABLET | Refills: 0 | Status: SHIPPED | OUTPATIENT
Start: 2024-04-26 | End: 2024-05-01

## 2024-04-26 RX ORDER — FLUCONAZOLE 150 MG/1
150 TABLET ORAL ONCE
Qty: 1 TABLET | Refills: 0 | Status: SHIPPED | OUTPATIENT
Start: 2024-04-26 | End: 2024-04-26

## 2024-04-26 ASSESSMENT — ENCOUNTER SYMPTOMS
GASTROINTESTINAL NEGATIVE: 1
CONSTITUTIONAL NEGATIVE: 1
RESPIRATORY NEGATIVE: 1
CARDIOVASCULAR NEGATIVE: 1

## 2024-04-26 ASSESSMENT — PATIENT HEALTH QUESTIONNAIRE - PHQ9
1. LITTLE INTEREST OR PLEASURE IN DOING THINGS: SEVERAL DAYS
SUM OF ALL RESPONSES TO PHQ9 QUESTIONS 1 AND 2: 1
2. FEELING DOWN, DEPRESSED OR HOPELESS: NOT AT ALL

## 2024-04-26 NOTE — PROGRESS NOTES
"Subjective   Patient ID: Ekaterina Truong is a 63 y.o. female who presents for pt here 1 month med check  (Pt c/o chest congestion , productive cough x 4 days).    HPI   ANXIETY/DEPRESSION: RAJEEV 3, PHQ 4 today. Doing well with 10mg of Buspar daily, would like to try Wellbutrin again.     Endorses 5 days of coughing productive, sinus congestion, HA.    Review of Systems   Constitutional: Negative.    Respiratory: Negative.     Cardiovascular: Negative.    Gastrointestinal: Negative.        Objective   /80   Pulse 90   Ht 1.646 m (5' 4.8\")   Wt 47.2 kg (104 lb)   SpO2 98%   BMI 17.41 kg/m²     Physical Exam  Constitutional:       General: She is not in acute distress.     Appearance: Normal appearance. She is not ill-appearing.   HENT:      Head: Normocephalic and atraumatic.      Nose: Congestion present.   Eyes:      Extraocular Movements: Extraocular movements intact.      Conjunctiva/sclera: Conjunctivae normal.   Cardiovascular:      Rate and Rhythm: Normal rate.   Pulmonary:      Effort: Pulmonary effort is normal.   Abdominal:      General: There is no distension.   Musculoskeletal:         General: Normal range of motion.      Cervical back: Normal range of motion.   Skin:     General: Skin is warm and dry.   Neurological:      General: No focal deficit present.      Mental Status: She is alert and oriented to person, place, and time.   Psychiatric:         Mood and Affect: Mood normal.         Behavior: Behavior normal.         Thought Content: Thought content normal.         Judgment: Judgment normal.         Assessment/Plan        Restart Wellbutrin, continue Buspar.  Rx Zpack for URI, fluconazole for chronic yeast infection after abx  Follow up 2 months or sooner prn  "

## 2024-05-16 ENCOUNTER — APPOINTMENT (OUTPATIENT)
Dept: GASTROENTEROLOGY | Facility: CLINIC | Age: 64
End: 2024-05-16
Payer: COMMERCIAL

## 2024-06-27 ENCOUNTER — APPOINTMENT (OUTPATIENT)
Dept: PRIMARY CARE | Facility: CLINIC | Age: 64
End: 2024-06-27
Payer: COMMERCIAL

## 2024-06-27 VITALS
BODY MASS INDEX: 17 KG/M2 | OXYGEN SATURATION: 95 % | DIASTOLIC BLOOD PRESSURE: 82 MMHG | HEART RATE: 102 BPM | WEIGHT: 102 LBS | HEIGHT: 65 IN | SYSTOLIC BLOOD PRESSURE: 138 MMHG

## 2024-06-27 DIAGNOSIS — F41.9 ANXIETY: ICD-10-CM

## 2024-06-27 DIAGNOSIS — F32.A DEPRESSION, UNSPECIFIED DEPRESSION TYPE: ICD-10-CM

## 2024-06-27 PROCEDURE — 99213 OFFICE O/P EST LOW 20 MIN: CPT

## 2024-06-27 RX ORDER — BUSPIRONE HYDROCHLORIDE 10 MG/1
10 TABLET ORAL 2 TIMES DAILY PRN
Qty: 60 TABLET | Refills: 2 | Status: SHIPPED | OUTPATIENT
Start: 2024-06-27 | End: 2024-09-25

## 2024-06-27 ASSESSMENT — ENCOUNTER SYMPTOMS
RESPIRATORY NEGATIVE: 1
CONSTITUTIONAL NEGATIVE: 1
CARDIOVASCULAR NEGATIVE: 1
GASTROINTESTINAL NEGATIVE: 1

## 2024-06-27 NOTE — PROGRESS NOTES
"Subjective   Patient ID: Ekaterina Truong is a 63 y.o. female who presents for pt here for [facility] .    HPI   ANXIETY/DEPRESSION: RAJEEV 4, PHQ 8 today. Current regimen 20mg of Buspar daily, 10am and 10 afternoon. Wellbutrin caused nosebleeds. Noticing some mild dizziness after afternoon dose of Buspar.    Review of Systems   Constitutional: Negative.    Respiratory: Negative.     Cardiovascular: Negative.    Gastrointestinal: Negative.        Objective   /82   Pulse 102   Ht 1.646 m (5' 4.8\")   Wt 46.3 kg (102 lb)   SpO2 95%   BMI 17.08 kg/m²     Physical Exam  Constitutional:       General: She is not in acute distress.     Appearance: Normal appearance. She is not ill-appearing.   HENT:      Head: Normocephalic and atraumatic.   Eyes:      Extraocular Movements: Extraocular movements intact.      Conjunctiva/sclera: Conjunctivae normal.   Cardiovascular:      Rate and Rhythm: Normal rate.   Pulmonary:      Effort: Pulmonary effort is normal.   Abdominal:      General: There is no distension.   Musculoskeletal:         General: Normal range of motion.      Cervical back: Normal range of motion.   Skin:     General: Skin is warm and dry.   Neurological:      General: No focal deficit present.      Mental Status: She is alert and oriented to person, place, and time.   Psychiatric:         Mood and Affect: Mood normal.         Behavior: Behavior normal.         Thought Content: Thought content normal.         Judgment: Judgment normal.         Assessment/Plan        Advised take 10mg am and 5mg afternoon, also may trial 20mg am.  Follow up 3 months or sooner prn.  "

## 2024-07-07 DIAGNOSIS — R10.13 DYSPEPSIA: Primary | ICD-10-CM

## 2024-07-08 ENCOUNTER — TELEPHONE (OUTPATIENT)
Dept: PRIMARY CARE | Facility: CLINIC | Age: 64
End: 2024-07-08
Payer: COMMERCIAL

## 2024-07-08 DIAGNOSIS — J06.9 UPPER RESPIRATORY TRACT INFECTION, UNSPECIFIED TYPE: Primary | ICD-10-CM

## 2024-07-08 DIAGNOSIS — B37.9 YEAST INFECTION: Primary | ICD-10-CM

## 2024-07-08 RX ORDER — PANTOPRAZOLE SODIUM 40 MG/1
40 TABLET, DELAYED RELEASE ORAL 2 TIMES DAILY
Qty: 60 TABLET | Refills: 11 | Status: SHIPPED | OUTPATIENT
Start: 2024-07-08

## 2024-07-08 RX ORDER — AZITHROMYCIN 250 MG/1
TABLET, FILM COATED ORAL DAILY
Qty: 6 TABLET | Refills: 0 | Status: SHIPPED | OUTPATIENT
Start: 2024-07-08 | End: 2024-07-13

## 2024-07-08 RX ORDER — FLUCONAZOLE 150 MG/1
150 TABLET ORAL ONCE
Qty: 1 TABLET | Refills: 0 | Status: SHIPPED | OUTPATIENT
Start: 2024-07-08 | End: 2024-07-08

## 2024-07-08 NOTE — TELEPHONE ENCOUNTER
States she saw you recently for an appointment & prescribed flonase, took for 4 days, made her heart rate go up & has knot in her throat. Please call to discuss

## 2024-07-08 NOTE — TELEPHONE ENCOUNTER
Pt states she has sore throat, she can feel knots on the left side of her neck, HA between the eyes,  heart rate has returned to normal since she discontinue the Flonase- she is going on  vacation and would like to know if antibiotic is needed ?

## 2024-08-08 ENCOUNTER — TELEPHONE (OUTPATIENT)
Dept: PRIMARY CARE | Facility: CLINIC | Age: 64
End: 2024-08-08
Payer: COMMERCIAL

## 2024-08-08 NOTE — TELEPHONE ENCOUNTER
SHE THINKS SHE HAS A UTI, OR BLADDER INFECTION. HER LOWER BACK HURTS, BELLY HURTS, WHEN GOING TO BATHROOM NOT A LOT COMING OUT. PLEASE CALL HER BACK. THANK YOU.

## 2024-08-14 DIAGNOSIS — K86.89 PANCREATIC INSUFFICIENCY (HHS-HCC): Primary | ICD-10-CM

## 2024-08-14 RX ORDER — PANCRELIPASE LIPASE, PANCRELIPASE PROTEASE, PANCRELIPASE AMYLASE 40000; 126000; 168000 [USP'U]/1; [USP'U]/1; [USP'U]/1
2 CAPSULE, DELAYED RELEASE ORAL 3 TIMES DAILY
Qty: 300 CAPSULE | Refills: 11 | Status: SHIPPED | OUTPATIENT
Start: 2024-08-14

## 2024-09-06 DIAGNOSIS — K29.60 ALKALINE REFLUX GASTRITIS: ICD-10-CM

## 2024-09-06 RX ORDER — SUCRALFATE 1 G/1
1 TABLET ORAL
Qty: 180 TABLET | Refills: 2 | Status: SHIPPED | OUTPATIENT
Start: 2024-09-06

## 2024-09-24 ENCOUNTER — APPOINTMENT (OUTPATIENT)
Dept: PRIMARY CARE | Facility: CLINIC | Age: 64
End: 2024-09-24
Payer: COMMERCIAL

## 2024-09-24 VITALS
HEART RATE: 81 BPM | DIASTOLIC BLOOD PRESSURE: 82 MMHG | WEIGHT: 101 LBS | SYSTOLIC BLOOD PRESSURE: 128 MMHG | HEIGHT: 65 IN | BODY MASS INDEX: 16.83 KG/M2 | OXYGEN SATURATION: 98 %

## 2024-09-24 DIAGNOSIS — Z12.11 COLON CANCER SCREENING: ICD-10-CM

## 2024-09-24 DIAGNOSIS — Z13.29 SCREENING FOR THYROID DISORDER: ICD-10-CM

## 2024-09-24 DIAGNOSIS — Z13.220 SCREENING CHOLESTEROL LEVEL: ICD-10-CM

## 2024-09-24 DIAGNOSIS — R03.0 ELEVATED BP WITHOUT DIAGNOSIS OF HYPERTENSION: ICD-10-CM

## 2024-09-24 DIAGNOSIS — F41.9 ANXIETY: ICD-10-CM

## 2024-09-24 DIAGNOSIS — R10.13 DYSPEPSIA: Primary | ICD-10-CM

## 2024-09-24 DIAGNOSIS — F32.A DEPRESSION, UNSPECIFIED DEPRESSION TYPE: ICD-10-CM

## 2024-09-24 DIAGNOSIS — M25.552 PAIN OF LEFT HIP: ICD-10-CM

## 2024-09-24 PROCEDURE — 3008F BODY MASS INDEX DOCD: CPT

## 2024-09-24 PROCEDURE — 99214 OFFICE O/P EST MOD 30 MIN: CPT

## 2024-09-24 RX ORDER — DICLOFENAC SODIUM 10 MG/G
4 GEL TOPICAL 4 TIMES DAILY
Qty: 100 G | Refills: 1 | Status: SHIPPED | OUTPATIENT
Start: 2024-09-24

## 2024-09-24 RX ORDER — BUSPIRONE HYDROCHLORIDE 10 MG/1
10 TABLET ORAL 2 TIMES DAILY PRN
Qty: 60 TABLET | Refills: 11 | Status: SHIPPED | OUTPATIENT
Start: 2024-09-24 | End: 2025-09-24

## 2024-09-24 RX ORDER — DICLOFENAC SODIUM 75 MG/1
75 TABLET, DELAYED RELEASE ORAL 2 TIMES DAILY PRN
Qty: 20 TABLET | Refills: 0 | Status: SHIPPED | OUTPATIENT
Start: 2024-09-24 | End: 2024-10-04

## 2024-09-24 ASSESSMENT — ENCOUNTER SYMPTOMS
GASTROINTESTINAL NEGATIVE: 1
CONSTITUTIONAL NEGATIVE: 1
CARDIOVASCULAR NEGATIVE: 1
RESPIRATORY NEGATIVE: 1

## 2024-09-24 ASSESSMENT — PATIENT HEALTH QUESTIONNAIRE - PHQ9
2. FEELING DOWN, DEPRESSED OR HOPELESS: NOT AT ALL
1. LITTLE INTEREST OR PLEASURE IN DOING THINGS: NOT AT ALL
SUM OF ALL RESPONSES TO PHQ9 QUESTIONS 1 AND 2: 0

## 2024-09-24 NOTE — PROGRESS NOTES
"Subjective   Patient ID: Ekaterina Truong is a 64 y.o. female who presents for pt here for 3 month med check  (Pt c/o chest congestion, left leg pain radiates down leg, ankle swelling x 2 weeks ,  pt needs colonoscopy order).    HPI   ANXIETY/DEPRESSION: RAJEEV 2, PHQ 6 today. Current regimen 20mg of Buspar daily, 10am and 10 afternoon. Wellbutrin caused nosebleeds. Stable, no SE.  DYSPEPSIA: Follows with Dr. Fairbanks.   ELEVATED BP: Has been elevated in the past, but will fluctuate. BP good in office today.  HIP PAIN: Recent, L hip/low back, radiating down L leg. Motrin somewhat helpful.    Colonoscopy 2018, due 2023, need to order.    Review of Systems   Constitutional: Negative.    Respiratory: Negative.     Cardiovascular: Negative.    Gastrointestinal: Negative.        Objective   /82   Pulse 81   Ht 1.646 m (5' 4.8\")   Wt 45.8 kg (101 lb)   SpO2 98%   BMI 16.91 kg/m²     Physical Exam  Constitutional:       General: She is not in acute distress.     Appearance: Normal appearance. She is not ill-appearing.   HENT:      Head: Normocephalic and atraumatic.   Eyes:      Extraocular Movements: Extraocular movements intact.      Conjunctiva/sclera: Conjunctivae normal.   Cardiovascular:      Rate and Rhythm: Normal rate.   Pulmonary:      Effort: Pulmonary effort is normal.   Abdominal:      General: There is no distension.   Musculoskeletal:         General: Normal range of motion.      Cervical back: Normal range of motion.   Skin:     General: Skin is warm and dry.   Neurological:      General: No focal deficit present.      Mental Status: She is alert and oriented to person, place, and time.   Psychiatric:         Mood and Affect: Mood normal.         Behavior: Behavior normal.         Thought Content: Thought content normal.         Judgment: Judgment normal.         Assessment/Plan        Continue Buspar.  Colonoscopy ordered.  Rx Volteran gel and diclofenac oral prn hip/low back pain.  Follow up 6 " months with fasting labs prior.

## 2024-09-27 DIAGNOSIS — Z86.010 PERSONAL HISTORY OF COLONIC POLYPS: ICD-10-CM

## 2024-09-27 DIAGNOSIS — Z12.11 COLON CANCER SCREENING: ICD-10-CM

## 2024-10-24 RX ORDER — LANOLIN ALCOHOL/MO/W.PET/CERES
1000 CREAM (GRAM) TOPICAL DAILY
COMMUNITY

## 2024-10-29 ENCOUNTER — APPOINTMENT (OUTPATIENT)
Dept: GASTROENTEROLOGY | Facility: CLINIC | Age: 64
End: 2024-10-29
Payer: COMMERCIAL

## 2024-10-29 ENCOUNTER — HOSPITAL ENCOUNTER (OUTPATIENT)
Dept: OPERATING ROOM | Facility: HOSPITAL | Age: 64
Setting detail: OUTPATIENT SURGERY
Discharge: HOME | End: 2024-10-29
Payer: COMMERCIAL

## 2024-10-29 VITALS
WEIGHT: 103.17 LBS | HEART RATE: 83 BPM | OXYGEN SATURATION: 95 % | TEMPERATURE: 97.5 F | HEIGHT: 65 IN | DIASTOLIC BLOOD PRESSURE: 70 MMHG | BODY MASS INDEX: 17.19 KG/M2 | RESPIRATION RATE: 18 BRPM | SYSTOLIC BLOOD PRESSURE: 134 MMHG

## 2024-10-29 DIAGNOSIS — Z86.0100 HISTORY OF COLONIC POLYPS: ICD-10-CM

## 2024-10-29 DIAGNOSIS — Z12.11 COLON CANCER SCREENING: ICD-10-CM

## 2024-10-29 PROCEDURE — 3600000007 HC OR TIME - EACH INCREMENTAL 1 MINUTE - PROCEDURE LEVEL TWO

## 2024-10-29 PROCEDURE — 99153 MOD SED SAME PHYS/QHP EA: CPT | Performed by: INTERNAL MEDICINE

## 2024-10-29 PROCEDURE — 3700000012 HC SEDATION LEVEL 5+ TIME - INITIAL 15 MINUTES 5/> YEARS

## 2024-10-29 PROCEDURE — 7100000009 HC PHASE TWO TIME - INITIAL BASE CHARGE

## 2024-10-29 PROCEDURE — 99152 MOD SED SAME PHYS/QHP 5/>YRS: CPT | Performed by: INTERNAL MEDICINE

## 2024-10-29 PROCEDURE — 45380 COLONOSCOPY AND BIOPSY: CPT | Performed by: INTERNAL MEDICINE

## 2024-10-29 PROCEDURE — 3600000002 HC OR TIME - INITIAL BASE CHARGE - PROCEDURE LEVEL TWO

## 2024-10-29 PROCEDURE — 3700000013 HC SEDATION LEVEL 5+ TIME - EACH ADDITIONAL 15 MINUTES

## 2024-10-29 PROCEDURE — 2500000004 HC RX 250 GENERAL PHARMACY W/ HCPCS (ALT 636 FOR OP/ED): Mod: JZ | Performed by: INTERNAL MEDICINE

## 2024-10-29 PROCEDURE — 7100000010 HC PHASE TWO TIME - EACH INCREMENTAL 1 MINUTE

## 2024-10-29 PROCEDURE — 45385 COLONOSCOPY W/LESION REMOVAL: CPT | Performed by: INTERNAL MEDICINE

## 2024-10-29 RX ORDER — MIDAZOLAM HYDROCHLORIDE 5 MG/ML
INJECTION, SOLUTION INTRAMUSCULAR; INTRAVENOUS AS NEEDED
Status: COMPLETED | OUTPATIENT
Start: 2024-10-29 | End: 2024-10-29

## 2024-10-29 RX ORDER — MEPERIDINE HYDROCHLORIDE 25 MG/ML
INJECTION INTRAMUSCULAR; INTRAVENOUS; SUBCUTANEOUS AS NEEDED
Status: COMPLETED | OUTPATIENT
Start: 2024-10-29 | End: 2024-10-29

## 2024-10-29 RX ORDER — MULTIVIT-MIN/IRON FUM/FOLIC AC 7.5 MG-4
1 TABLET ORAL DAILY
COMMUNITY

## 2024-10-29 ASSESSMENT — PAIN - FUNCTIONAL ASSESSMENT
PAIN_FUNCTIONAL_ASSESSMENT: 0-10

## 2024-10-29 ASSESSMENT — PAIN SCALES - GENERAL
PAINLEVEL_OUTOF10: 0 - NO PAIN
PAINLEVEL_OUTOF10: 3
PAINLEVEL_OUTOF10: 3
PAINLEVEL_OUTOF10: 0 - NO PAIN

## 2024-10-29 ASSESSMENT — COLUMBIA-SUICIDE SEVERITY RATING SCALE - C-SSRS
2. HAVE YOU ACTUALLY HAD ANY THOUGHTS OF KILLING YOURSELF?: NO
1. IN THE PAST MONTH, HAVE YOU WISHED YOU WERE DEAD OR WISHED YOU COULD GO TO SLEEP AND NOT WAKE UP?: NO
6. HAVE YOU EVER DONE ANYTHING, STARTED TO DO ANYTHING, OR PREPARED TO DO ANYTHING TO END YOUR LIFE?: NO

## 2024-11-13 ENCOUNTER — TELEPHONE (OUTPATIENT)
Dept: GASTROENTEROLOGY | Facility: CLINIC | Age: 64
End: 2024-11-13
Payer: COMMERCIAL

## 2024-11-13 NOTE — TELEPHONE ENCOUNTER
PATIENT NOTIFIED AND VERBALIZED UNDERSTANDING     3 year repeat card made and filed.    ----- Message from Brian Fairbanks sent at 11/11/2024  1:08 PM EST -----  1 sessile serrated polyp measuring less than 0.5 cm and 1 adenomatous polyp measuring less than 0.5 cm.  Repeat colonoscopy in 3 years

## 2025-02-10 DIAGNOSIS — J01.40 ACUTE NON-RECURRENT PANSINUSITIS: Primary | ICD-10-CM

## 2025-02-10 DIAGNOSIS — B37.9 YEAST INFECTION: Primary | ICD-10-CM

## 2025-02-10 RX ORDER — FLUCONAZOLE 150 MG/1
150 TABLET ORAL ONCE
Qty: 1 TABLET | Refills: 0 | Status: SHIPPED | OUTPATIENT
Start: 2025-02-10 | End: 2025-02-10

## 2025-02-10 RX ORDER — AZITHROMYCIN 250 MG/1
TABLET, FILM COATED ORAL
Qty: 6 TABLET | Refills: 0 | Status: SHIPPED | OUTPATIENT
Start: 2025-02-10 | End: 2025-02-15

## 2025-03-25 ENCOUNTER — APPOINTMENT (OUTPATIENT)
Dept: PRIMARY CARE | Facility: CLINIC | Age: 65
End: 2025-03-25
Payer: COMMERCIAL

## 2025-03-25 VITALS
WEIGHT: 109 LBS | HEIGHT: 64 IN | BODY MASS INDEX: 18.61 KG/M2 | SYSTOLIC BLOOD PRESSURE: 134 MMHG | OXYGEN SATURATION: 97 % | DIASTOLIC BLOOD PRESSURE: 80 MMHG | HEART RATE: 74 BPM

## 2025-03-25 DIAGNOSIS — R10.13 DYSPEPSIA: ICD-10-CM

## 2025-03-25 DIAGNOSIS — J01.40 ACUTE NON-RECURRENT PANSINUSITIS: Primary | ICD-10-CM

## 2025-03-25 DIAGNOSIS — F41.9 ANXIETY: ICD-10-CM

## 2025-03-25 DIAGNOSIS — B37.9 YEAST INFECTION: ICD-10-CM

## 2025-03-25 DIAGNOSIS — R03.0 ELEVATED BP WITHOUT DIAGNOSIS OF HYPERTENSION: ICD-10-CM

## 2025-03-25 DIAGNOSIS — F32.A DEPRESSION, UNSPECIFIED DEPRESSION TYPE: ICD-10-CM

## 2025-03-25 PROCEDURE — 99214 OFFICE O/P EST MOD 30 MIN: CPT

## 2025-03-25 PROCEDURE — 3008F BODY MASS INDEX DOCD: CPT

## 2025-03-25 RX ORDER — AZITHROMYCIN 250 MG/1
TABLET, FILM COATED ORAL
Qty: 6 TABLET | Refills: 0 | Status: SHIPPED | OUTPATIENT
Start: 2025-03-25 | End: 2025-03-30

## 2025-03-25 RX ORDER — FLUCONAZOLE 150 MG/1
150 TABLET ORAL ONCE
Qty: 1 TABLET | Refills: 0 | Status: SHIPPED | OUTPATIENT
Start: 2025-03-25 | End: 2025-03-25

## 2025-03-25 ASSESSMENT — PATIENT HEALTH QUESTIONNAIRE - PHQ9
SUM OF ALL RESPONSES TO PHQ9 QUESTIONS 1 AND 2: 0
2. FEELING DOWN, DEPRESSED OR HOPELESS: NOT AT ALL
1. LITTLE INTEREST OR PLEASURE IN DOING THINGS: NOT AT ALL

## 2025-03-25 ASSESSMENT — ENCOUNTER SYMPTOMS
GASTROINTESTINAL NEGATIVE: 1
CARDIOVASCULAR NEGATIVE: 1
RESPIRATORY NEGATIVE: 1
CONSTITUTIONAL NEGATIVE: 1

## 2025-03-25 NOTE — PROGRESS NOTES
"Subjective   Patient ID: Ekaterina Truong is a 64 y.o. female who presents for pt here for 6 month med check, review labs (Sinus drainage, runny nose).    HPI   ANXIETY/DEPRESSION: Current regimen 20mg of Buspar daily, 10am and 10 afternoon. Wellbutrin caused nosebleeds. Stable, no SE.  DYSPEPSIA: Follows with Dr. Fairbanks.   ELEVATED BP: Has been elevated in the past, but will fluctuate. BP good in office today.  HIP PAIN: L hip/low back, radiating down L leg. Motrin somewhat helpful. Stable.    Endorses sinus congestion/pressure/HA/sore throat x about 1 week.     Colonoscopy 2024, due 2029.  Gets mammos with [facility].    Review of Systems   Constitutional: Negative.    Respiratory: Negative.     Cardiovascular: Negative.    Gastrointestinal: Negative.        Objective   /80   Pulse 74   Ht 1.626 m (5' 4\")   Wt 49.4 kg (109 lb)   SpO2 97%   BMI 18.71 kg/m²     Physical Exam  Constitutional:       General: She is not in acute distress.     Appearance: Normal appearance. She is not ill-appearing.   HENT:      Head: Normocephalic and atraumatic.   Eyes:      Extraocular Movements: Extraocular movements intact.      Conjunctiva/sclera: Conjunctivae normal.   Cardiovascular:      Rate and Rhythm: Normal rate.   Pulmonary:      Effort: Pulmonary effort is normal.   Abdominal:      General: There is no distension.   Musculoskeletal:         General: Normal range of motion.      Cervical back: Normal range of motion.   Skin:     General: Skin is warm and dry.   Neurological:      General: No focal deficit present.      Mental Status: She is alert and oriented to person, place, and time.   Psychiatric:         Mood and Affect: Mood normal.         Behavior: Behavior normal.         Thought Content: Thought content normal.         Judgment: Judgment normal.         Assessment/Plan        Chronic conditions stable.  No medication changes today.  Rx Zpack.  Labs reviewed and stable.  Follow up 6 months or sooner " prn.

## 2025-05-05 ENCOUNTER — OFFICE VISIT (OUTPATIENT)
Dept: URGENT CARE | Facility: CLINIC | Age: 65
End: 2025-05-05
Payer: COMMERCIAL

## 2025-05-05 VITALS
HEART RATE: 115 BPM | DIASTOLIC BLOOD PRESSURE: 108 MMHG | OXYGEN SATURATION: 98 % | WEIGHT: 109 LBS | SYSTOLIC BLOOD PRESSURE: 174 MMHG | BODY MASS INDEX: 18.61 KG/M2 | TEMPERATURE: 98.2 F | HEIGHT: 64 IN

## 2025-05-05 DIAGNOSIS — R30.0 DYSURIA: Primary | ICD-10-CM

## 2025-05-05 PROCEDURE — 81002 URINALYSIS NONAUTO W/O SCOPE: CPT | Performed by: NURSE PRACTITIONER

## 2025-05-05 PROCEDURE — 99213 OFFICE O/P EST LOW 20 MIN: CPT | Performed by: NURSE PRACTITIONER

## 2025-05-05 RX ORDER — CIPROFLOXACIN 500 MG/1
500 TABLET ORAL 2 TIMES DAILY
Qty: 14 TABLET | Refills: 0 | Status: SHIPPED | OUTPATIENT
Start: 2025-05-05 | End: 2025-05-12

## 2025-05-05 RX ORDER — FLUCONAZOLE 150 MG/1
150 TABLET ORAL ONCE
Qty: 1 TABLET | Refills: 0 | Status: SHIPPED | OUTPATIENT
Start: 2025-05-05 | End: 2025-05-05

## 2025-05-05 NOTE — PROGRESS NOTES
64 y.o. female presents for evaluation of suprapubic tenderness, dysuria, low back pain and urinary frequency for the past 3 days.  Denies fever, flank pain, nausea, vomiting, diarrhea, body aches, fatigue, vaginal discharge, or any other associated symptoms or complaint. No otc meds for symptoms. No recent UTI. No other complaints.      Vitals:    05/05/25 1322   BP: (!) 174/108   Pulse: (!) 115   Temp: 36.8 °C (98.2 °F)   SpO2: 98%       RX Allergies[1]    Medication Documentation Review Audit       Reviewed by Sanjana Ernst MA (Medical Assistant) on 05/05/25 at 1320      Medication Order Taking? Sig Documenting Provider Last Dose Status   busPIRone (Buspar) 10 mg tablet [id number] Yes Take 1 tablet (10 mg) by mouth 2 times a day as needed (Anxiety). Salvador Sadler PA-C  Active   cyanocobalamin (Vitamin B-12) 1,000 mcg tablet [id number] Yes Take 1 tablet (1,000 mcg) by mouth once daily. Historical Provider, MD  Active   lipase-protease-amylase (Zenpep) 40,000-126,000- 168,000 unit capsule [id number] Yes TAKE 2 CAPSULES BY MOUTH THREE TIMES DAILY Brian Fairbanks DO  Active   multivitamin with minerals tablet [id number] Yes Take 1 tablet by mouth once daily. Historical Provider, MD  Active   NON FORMULARY [id number] Yes Sasha relief Historical Provider, MD  Active   pantoprazole (ProtoNix) 40 mg EC tablet [id number] Yes Take 1 tablet by mouth twice daily Brian Fairbanks DO  Active   sucralfate (Carafate) 1 gram tablet [id number] Yes TAKE 1 TABLET BY MOUTH TWICE DAILY BEFORE  MEAL(S) Brian Fairbanks DO  Active                    Medical History[2]    Surgical History[3]    ROS  See HPI    Physical Exam  Vitals and nursing note reviewed.   Constitutional:       General: She is not in acute distress.     Appearance: Normal appearance. She is not ill-appearing or toxic-appearing.   Cardiovascular:      Rate and Rhythm: Normal rate and regular rhythm.   Abdominal:      General: There is no distension.      Palpations: Abdomen  is soft.      Tenderness: There is abdominal tenderness in the suprapubic area. There is no right CVA tenderness, left CVA tenderness, guarding or rebound.   Genitourinary:     General: Normal vulva.      Vagina: No vaginal discharge.      Comments: Per patient report  Skin:     General: Skin is warm and dry.   Neurological:      General: No focal deficit present.      Mental Status: She is alert and oriented to person, place, and time.   Psychiatric:         Mood and Affect: Mood normal.         Behavior: Behavior normal.       Recent Results (from the past hour)   POCT UA (nonautomated w/o microscopy) manually resulted    Collection Time: 05/05/25  1:26 PM   Result Value Ref Range    POC Color, Urine Yellow Straw, Yellow, Light-Yellow    POC Appearance, Urine Clear Clear    POC Glucose, Urine NEGATIVE NEGATIVE mg/dl    POC Bilirubin, Urine NEGATIVE NEGATIVE    POC Ketones, Urine NEGATIVE NEGATIVE mg/dl    POC Specific Gravity, Urine 1.010 1.005 - 1.035    POC Blood, Urine SMALL (1+) (A) NEGATIVE    POC PH, Urine 7.0 No Reference Range Established PH    POC Protein, Urine NEGATIVE NEGATIVE mg/dl    POC Urobilinogen, Urine 0.2 0.2, 1.0 EU/DL    Poc Nitrite, Urine NEGATIVE NEGATIVE    POC Leukocytes, Urine NEGATIVE NEGATIVE        Assessment/Plan/MDM  Ekaterina was seen today for uti.  Diagnoses and all orders for this visit:  Dysuria (Primary)  -     POCT UA (nonautomated w/o microscopy) manually resulted  -     Urine Culture  -     ciprofloxacin (Cipro) 500 mg tablet; Take 1 tablet (500 mg) by mouth 2 times a day for 7 days.  -     fluconazole (Diflucan) 150 mg tablet; Take 1 tablet (150 mg) by mouth 1 time for 1 dose. Take after last dose of of antibiotic    Discussed worsening signs symptoms when to seek higher level of care.  Encourage patient to increase water intake, avoid caffeine/sugary drinks.  Patient's clinical presentation is otherwise unremarkable at this time. Patient is discharged with instructions to  follow-up with primary care or seek emergency medical attention for worsening symptoms or any new concerns.    I did personally review Ekaterina's past medical history, surgical history, social history, as well as family history (when relevant).  In this case, I also oversaw the her drug management by reviewing her medication list, allergy list, as well as the medications that I prescribed during the UC course and/or recommended as an out-patient (including possible OTC medications such as acetaminophen, NSAIDs , etc).    After reviewing the items above, I did look at previous medical documentation, such as recent hospitalizations, office visits, and/or recent consultations with PCP/specialist.                          SDOH:   Another factor that I considered in Ekaterina's care was her Social Determinants of Health (SDOH). During this UC encounter, she did not have social determinants of health. Those SDOH influencing Ekaterina's care are: none      Chaim Burgos CNP  [facility]  [phone]       [1]   Allergies  Allergen Reactions    Tamsulosin Shortness of breath    Cephalosporins Itching    Doxycycline Other    Naproxen Sodium Itching    Omeprazole Itching    Sulfamethoxazole-Trimethoprim Itching   [2]   Past Medical History:  Diagnosis Date    Abdominal distension (gaseous)     Bloating    Anesthesia of skin     Numbness and tingling    Anxiety     Attention and concentration deficit     Difficulty concentrating    Calculus of kidney     Bilateral kidney stones    Depression     Inadequate sleep hygiene     History of difficulty sleeping    Lobulated, fused and horseshoe kidney     Horseshoe kidney    Other conditions influencing health status     Cyst of neck    Other general symptoms and signs     Heat intolerance    Personal history of other diseases of the digestive system     History of constipation    Personal history of other diseases of the musculoskeletal system and connective tissue     History  of chronic back pain    Personal history of other diseases of the musculoskeletal system and connective tissue     History of neck pain    Personal history of other diseases of the respiratory system     History of acute sinusitis    Personal history of other diseases of the respiratory system     History of asthma    Personal history of other specified conditions     History of abdominal pain    Personal history of other specified conditions     History of dizziness    Personal history of other specified conditions     History of fever    Personal history of other specified conditions     History of urinary frequency   [3]   Past Surgical History:  Procedure Laterality Date    ESOPHAGOGASTRODUODENOSCOPY  2024    OTHER SURGICAL HISTORY  10/11/2022    Gallbladder surgery    OTHER SURGICAL HISTORY  10/11/2022     section    OTHER SURGICAL HISTORY  10/24/2022    Colonoscopy

## 2025-05-08 ENCOUNTER — TELEPHONE (OUTPATIENT)
Dept: URGENT CARE | Facility: CLINIC | Age: 65
End: 2025-05-08
Payer: COMMERCIAL

## 2025-05-08 LAB — BACTERIA UR CULT: NORMAL

## 2025-05-08 NOTE — TELEPHONE ENCOUNTER
Result Communication    Resulted Orders   POCT UA (nonautomated w/o microscopy) manually resulted   Result Value Ref Range    POC Color, Urine Yellow Straw, Yellow, Light-Yellow    POC Appearance, Urine Clear Clear    POC Glucose, Urine NEGATIVE NEGATIVE mg/dl    POC Bilirubin, Urine NEGATIVE NEGATIVE    POC Ketones, Urine NEGATIVE NEGATIVE mg/dl    POC Specific Gravity, Urine 1.010 1.005 - 1.035    POC Blood, Urine SMALL (1+) (A) NEGATIVE    POC PH, Urine 7.0 No Reference Range Established PH    POC Protein, Urine NEGATIVE NEGATIVE mg/dl    POC Urobilinogen, Urine 0.2 0.2, 1.0 EU/DL    Poc Nitrite, Urine NEGATIVE NEGATIVE    POC Leukocytes, Urine NEGATIVE NEGATIVE   Urine Culture   Result Value Ref Range    CULTURE, URINE, ROUTINE SEE NOTE       Comment:          CULTURE, URINE, ROUTINE         Micro Number:      [id number]    Test Status:       Final    Specimen Source:   Urine    Specimen Quality:  Adequate    Result:            Mixed genital penelope isolated. These superficial                       bacteria are not indicative of a urinary tract                       infection. No further organism identification is                       warranted on this specimen. If clinically                       indicated, recollect clean-catch, mid-stream                       urine and transfer immediately to Urine Culture                       Transport Tube.         1:32 PM      Results were successfully communicated with the patient and they acknowledged their understanding.

## 2025-07-02 ENCOUNTER — TELEPHONE (OUTPATIENT)
Dept: PRIMARY CARE | Facility: CLINIC | Age: 65
End: 2025-07-02
Payer: COMMERCIAL

## 2025-07-02 NOTE — TELEPHONE ENCOUNTER
Patient see's Salvador, she called in stating that she hurts from her head to her ears and in her throat. No other information was left but she wants a medication sent to the pharmacy. Please let me know if you would like patient to be seen in the urgent care or if you will send something in. Thanks!

## 2025-07-02 NOTE — TELEPHONE ENCOUNTER
She should try to be evaluated in urgent care to see what exactly is causing her pain and what antibiotic would be appropriate

## 2025-07-02 NOTE — TELEPHONE ENCOUNTER
Called patient and gave her the information. She stated she could not afford the urgent care, I explained that we would have to wait until jerrell returns for his time off if she would like me to ask him to send her something. She said okay and then hung up, patient seem aggravated that nothing more was being done.

## 2025-07-04 DIAGNOSIS — R10.13 DYSPEPSIA: ICD-10-CM

## 2025-07-07 DIAGNOSIS — J06.9 UPPER RESPIRATORY TRACT INFECTION, UNSPECIFIED TYPE: Primary | ICD-10-CM

## 2025-07-07 DIAGNOSIS — B37.9 YEAST INFECTION: ICD-10-CM

## 2025-07-07 RX ORDER — FLUCONAZOLE 150 MG/1
150 TABLET ORAL ONCE
Qty: 1 TABLET | Refills: 0 | Status: SHIPPED | OUTPATIENT
Start: 2025-07-07 | End: 2025-07-07

## 2025-07-07 RX ORDER — AZITHROMYCIN 250 MG/1
TABLET, FILM COATED ORAL
Qty: 6 TABLET | Refills: 0 | Status: SHIPPED | OUTPATIENT
Start: 2025-07-07 | End: 2025-07-12

## 2025-07-08 RX ORDER — PANTOPRAZOLE SODIUM 40 MG/1
40 TABLET, DELAYED RELEASE ORAL 2 TIMES DAILY
Qty: 60 TABLET | Refills: 11 | Status: SHIPPED | OUTPATIENT
Start: 2025-07-08 | End: 2026-07-08

## 2025-09-04 DIAGNOSIS — R10.13 DYSPEPSIA: ICD-10-CM

## 2025-09-04 DIAGNOSIS — K86.89 PANCREATIC INSUFFICIENCY (HHS-HCC): ICD-10-CM

## 2025-09-05 RX ORDER — PANTOPRAZOLE SODIUM 40 MG/1
40 TABLET, DELAYED RELEASE ORAL 2 TIMES DAILY
Qty: 60 TABLET | Refills: 11 | Status: SHIPPED | OUTPATIENT
Start: 2025-09-05 | End: 2026-09-05

## 2025-09-05 RX ORDER — PANCRELIPASE LIPASE, PANCRELIPASE PROTEASE, PANCRELIPASE AMYLASE 40000; 126000; 168000 [USP'U]/1; [USP'U]/1; [USP'U]/1
2 CAPSULE, DELAYED RELEASE ORAL 3 TIMES DAILY
Qty: 300 CAPSULE | Refills: 11 | Status: SHIPPED | OUTPATIENT
Start: 2025-09-05

## 2025-09-23 ENCOUNTER — APPOINTMENT (OUTPATIENT)
Dept: PRIMARY CARE | Facility: CLINIC | Age: 65
End: 2025-09-23
Payer: COMMERCIAL

## 2025-11-13 ENCOUNTER — APPOINTMENT (OUTPATIENT)
Dept: GASTROENTEROLOGY | Facility: CLINIC | Age: 65
End: 2025-11-13
Payer: COMMERCIAL